# Patient Record
Sex: MALE | URBAN - METROPOLITAN AREA
[De-identification: names, ages, dates, MRNs, and addresses within clinical notes are randomized per-mention and may not be internally consistent; named-entity substitution may affect disease eponyms.]

---

## 2024-07-10 ENCOUNTER — OFFICE VISIT (OUTPATIENT)
Dept: DERMATOLOGY | Facility: CLINIC | Age: 74
End: 2024-07-10
Payer: COMMERCIAL

## 2024-07-10 ENCOUNTER — TELEPHONE (OUTPATIENT)
Dept: DERMATOLOGY | Facility: CLINIC | Age: 74
End: 2024-07-10

## 2024-07-10 DIAGNOSIS — L64.9 ANDROGENIC ALOPECIA: ICD-10-CM

## 2024-07-10 DIAGNOSIS — L82.1 SEBORRHEIC KERATOSES: ICD-10-CM

## 2024-07-10 DIAGNOSIS — L57.8 ACTINIC SKIN DAMAGE: ICD-10-CM

## 2024-07-10 DIAGNOSIS — D22.9 MULTIPLE BENIGN NEVI: ICD-10-CM

## 2024-07-10 DIAGNOSIS — D18.01 CHERRY ANGIOMA: ICD-10-CM

## 2024-07-10 DIAGNOSIS — L64.9 ANDROGENIC ALOPECIA: Primary | ICD-10-CM

## 2024-07-10 PROCEDURE — 99204 OFFICE O/P NEW MOD 45 MIN: CPT | Performed by: STUDENT IN AN ORGANIZED HEALTH CARE EDUCATION/TRAINING PROGRAM

## 2024-07-10 RX ORDER — SIMVASTATIN 20 MG
1 TABLET ORAL
COMMUNITY
Start: 2024-06-10

## 2024-07-10 RX ORDER — MINOXIDIL 2.5 MG/1
2.5 TABLET ORAL DAILY
Qty: 30 TABLET | Refills: 3 | Status: SHIPPED | OUTPATIENT
Start: 2024-07-10

## 2024-07-10 NOTE — PROGRESS NOTES
[facility] Dermatology Note    Encounter Date: Jul 10, 2024    Dermatology Problem List:    ______________________________________    Impression/Plan:  Carlos was seen today for derm problem.    Diagnoses and all orders for this visit:    Androgenic alopecia  -     minoxidil (ROGAINE) 5 % external solution; Use twice daily  -     minoxidil (LONITEN) 2.5 MG tablet; Take 1 tablet (2.5 mg) by mouth daily  - chronic  - interested in aggressive treatment  - start 5% minoxidil BID  - Start oral minoxidil 2.5mg/d  - start finasteride he has at home, unsure of dosage, askedhim to let me know if it is 5mg or 1mg  - Risks, benefits of treatment vs no treatment discussed. Proper use, side effects and time to improvement discussed for each medication    Actinic skin damage  - Reviewed the compounding benefits of incremental changes to sun protective clothing behaviors including increased frequency of sunscreen and sun protective clothing like broad brimmed hats and longsleeved UPF containing clothing    Seborrheic keratoses  Multiple benign nevi  Cherry angioma  - benign        Follow-up in 4 mo.       Staff Involved:  Staff Only    Evin Galindo MD   of Dermatology  Department of Dermatology  [facility]      CC:   Chief Complaint   Patient presents with    Derm Problem     Skin check   Hair loss - medication        History of Present Illness:  Mr. Carlos Moura is a 74 year old male who presents as a new patient.    Pt presents today for concerns about spots on trunk and extremities.       Labs:      Physical exam:  Vitals: There were no vitals taken for this visit.  GEN: well developed, well-nourished, in no acute distress, in a pleasant mood.     SKIN: Dangelo phototype 1  - Full skin, which includes the head/face, both arms, chest, back, abdomen,both legs, genitalia and/or groin buttocks, digits and/or nails, was examined.  - Stuck on brown papules  on trunk and extremities   - Flat brown macules and patches in a sun exposed areas on face and extremities  - scattered brown papules on trunk and extremities   - thinning of scalp hair on vertex on temporal scalp  - No other lesions of concern on areas examined.     Past Medical History:   History reviewed. No pertinent past medical history.  History reviewed. No pertinent surgical history.    Social History:   reports that he has never smoked. He has never used smokeless tobacco.    Family History:  History reviewed. No pertinent family history.    Medications:  Current Outpatient Medications   Medication Sig Dispense Refill    minoxidil (LONITEN) 2.5 MG tablet Take 1 tablet (2.5 mg) by mouth daily 30 tablet 3    minoxidil (ROGAINE) 5 % external solution Use twice daily 120 mL 3    simvastatin (ZOCOR) 20 MG tablet Take 1 tablet by mouth daily at 2 pm       No Known Allergies

## 2024-07-10 NOTE — LETTER
7/10/2024      Carlos Moura  [address] [place] [address] [place] [address]      Dear Colleague,    Thank you for referring your patient, Carlos Moura, to the [facility]. Please see a copy of my visit note below.    [facility] Dermatology Note    Encounter Date: Jul 10, 2024    Dermatology Problem List:    ______________________________________    Impression/Plan:  Carlos was seen today for derm problem.    Diagnoses and all orders for this visit:    Androgenic alopecia  -     minoxidil (ROGAINE) 5 % external solution; Use twice daily  -     minoxidil (LONITEN) 2.5 MG tablet; Take 1 tablet (2.5 mg) by mouth daily  - chronic  - interested in aggressive treatment  - start 5% minoxidil BID  - Start oral minoxidil 2.5mg/d  - start finasteride he has at home, unsure of dosage, askedhim to let me know if it is 5mg or 1mg  - Risks, benefits of treatment vs no treatment discussed. Proper use, side effects and time to improvement discussed for each medication    Actinic skin damage  - Reviewed the compounding benefits of incremental changes to sun protective clothing behaviors including increased frequency of sunscreen and sun protective clothing like broad brimmed hats and longsleeved UPF containing clothing    Seborrheic keratoses  Multiple benign nevi  Cherry angioma  - benign        Follow-up in 4 mo.       Staff Involved:  Staff Only    Evin Galindo MD   of Dermatology  Department of Dermatology  [facility]      CC:   Chief Complaint   Patient presents with     Derm Problem     Skin check   Hair loss - medication        History of Present Illness:  Mr. Carlos Moura is a 74 year old male who presents as a new patient.    Pt presents today for concerns about spots on trunk and extremities.       Labs:      Physical exam:  Vitals: There were no vitals taken for this visit.  GEN: well developed, well-nourished, in no acute  distress, in a pleasant mood.     SKIN: Dangelo phototype 1  - Full skin, which includes the head/face, both arms, chest, back, abdomen,both legs, genitalia and/or groin buttocks, digits and/or nails, was examined.  - Stuck on brown papules on trunk and extremities   - Flat brown macules and patches in a sun exposed areas on face and extremities  - scattered brown papules on trunk and extremities   - thinning of scalp hair on vertex on temporal scalp  - No other lesions of concern on areas examined.     Past Medical History:   History reviewed. No pertinent past medical history.  History reviewed. No pertinent surgical history.    Social History:   reports that he has never smoked. He has never used smokeless tobacco.    Family History:  History reviewed. No pertinent family history.    Medications:  Current Outpatient Medications   Medication Sig Dispense Refill     minoxidil (LONITEN) 2.5 MG tablet Take 1 tablet (2.5 mg) by mouth daily 30 tablet 3     minoxidil (ROGAINE) 5 % external solution Use twice daily 120 mL 3     simvastatin (ZOCOR) 20 MG tablet Take 1 tablet by mouth daily at 2 pm       No Known Allergies              Again, thank you for allowing me to participate in the care of your patient.        Sincerely,        Evin Galindo MD

## 2024-07-10 NOTE — PROGRESS NOTES
[facility] Dermatology Note    Encounter Date: Jul 10, 2024    Dermatology Problem List:    ______________________________________    Impression/Plan:  Carlos was seen today for derm problem.    Diagnoses and all orders for this visit:    Androgenic alopecia  -     minoxidil (ROGAINE) 5 % external solution; Use twice daily  -     minoxidil (LONITEN) 2.5 MG tablet; Take 1 tablet (2.5 mg) by mouth daily    Actinic skin damage    Seborrheic keratoses    Multiple benign nevi    Cherry angioma        {Procedure:634106}    Follow-up in ***.       Staff Involved:  Staff Only    Evin Galindo MD   of Dermatology  Department of Dermatology  [facility]      CC:   Chief Complaint   Patient presents with    Derm Problem     Skin check   Hair loss - medication        History of Present Illness:  Mr. Carlos Moura is a 74 year old male who presents as a new patient.    Prev rx'd finasteride has not taken it because he wasn't sure about which he'd like to do     Labs:      Physical exam:  Vitals: There were no vitals taken for this visit.  GEN: well developed, well-nourished, in no acute distress, in a pleasant mood.     SKIN: Dangelo phototype ***  - {Skin Exam:388661}  ***  - No other lesions of concern on areas examined.     Past Medical History:   History reviewed. No pertinent past medical history.  History reviewed. No pertinent surgical history.    Social History:   reports that he has never smoked. He has never used smokeless tobacco.    Family History:  History reviewed. No pertinent family history.    Medications:  Current Outpatient Medications   Medication Sig Dispense Refill    minoxidil (LONITEN) 2.5 MG tablet Take 1 tablet (2.5 mg) by mouth daily 30 tablet 3    minoxidil (ROGAINE) 5 % external solution Use twice daily 120 mL 3    simvastatin (ZOCOR) 20 MG tablet Take 1 tablet by mouth daily at 2 pm       No Known Allergies

## 2024-07-11 RX ORDER — MINOXIDIL 2.5 MG/1
2.5 TABLET ORAL DAILY
Qty: 90 TABLET | OUTPATIENT
Start: 2024-07-11

## 2024-07-11 RX ORDER — FINASTERIDE 5 MG/1
0.5 TABLET, FILM COATED ORAL
COMMUNITY
Start: 2023-12-01

## 2024-07-11 RX ORDER — AMLODIPINE BESYLATE 5 MG/1
1 TABLET ORAL
COMMUNITY
Start: 2024-06-12

## 2024-07-11 NOTE — TELEPHONE ENCOUNTER
Patient called re he has information about his Rx he is taking - he has 3 meds - Asymbostatin 20mg - Anlodipene Besolate 5mg - finasteride dosage is 5mg cut in half - 2.5 mg /day

## 2024-07-11 NOTE — TELEPHONE ENCOUNTER
Called and spoke with pt, he advised he has finasteride, dosage is 5mg cut in half - 2.5 mg /day. He does not need a refill at this time and will reach out when he does.    Thank you,  Jody LI RN  Dermatology   [phone]

## 2024-08-31 ENCOUNTER — HEALTH MAINTENANCE LETTER (OUTPATIENT)
Age: 74
End: 2024-08-31